# Patient Record
Sex: MALE | Race: WHITE | Employment: OTHER | URBAN - METROPOLITAN AREA
[De-identification: names, ages, dates, MRNs, and addresses within clinical notes are randomized per-mention and may not be internally consistent; named-entity substitution may affect disease eponyms.]

---

## 2017-08-14 ENCOUNTER — HOSPITAL ENCOUNTER (INPATIENT)
Facility: HOSPITAL | Age: 72
LOS: 7 days | Discharge: HOME | DRG: 247 | End: 2017-08-21
Attending: EMERGENCY MEDICINE | Admitting: INTERNAL MEDICINE
Payer: MEDICARE

## 2017-08-14 ENCOUNTER — APPOINTMENT (OUTPATIENT)
Dept: CV DIAGNOSTICS | Facility: HOSPITAL | Age: 72
DRG: 247 | End: 2017-08-14
Attending: INTERNAL MEDICINE
Payer: MEDICARE

## 2017-08-14 ENCOUNTER — APPOINTMENT (OUTPATIENT)
Dept: INTERVENTIONAL RADIOLOGY/VASCULAR | Facility: HOSPITAL | Age: 72
DRG: 247 | End: 2017-08-14
Attending: INTERNAL MEDICINE
Payer: MEDICARE

## 2017-08-14 ENCOUNTER — PRIOR ORIGINAL RECORDS (OUTPATIENT)
Dept: OTHER | Age: 72
End: 2017-08-14

## 2017-08-14 DIAGNOSIS — I21.02 ST ELEVATION MYOCARDIAL INFARCTION INVOLVING LEFT ANTERIOR DESCENDING (LAD) CORONARY ARTERY (HCC): ICD-10-CM

## 2017-08-14 DIAGNOSIS — I21.3 ST ELEVATION MYOCARDIAL INFARCTION (STEMI), UNSPECIFIED ARTERY (HCC): Primary | ICD-10-CM

## 2017-08-14 DIAGNOSIS — N28.9 RENAL INSUFFICIENCY: ICD-10-CM

## 2017-08-14 LAB
ANION GAP SERPL CALC-SCNC: 14 MMOL/L (ref 0–18)
APTT PPP: 25.1 SECONDS (ref 23.2–35.3)
APTT PPP: 48.4 SECONDS (ref 23.2–35.3)
BASOPHILS # BLD: 0.1 K/UL (ref 0–0.2)
BASOPHILS NFR BLD: 1 %
BUN SERPL-MCNC: 15 MG/DL (ref 8–20)
BUN/CREAT SERPL: 9.3 (ref 10–20)
CALCIUM SERPL-MCNC: 9.1 MG/DL (ref 8.5–10.5)
CHLORIDE SERPL-SCNC: 101 MMOL/L (ref 95–110)
CO2 SERPL-SCNC: 22 MMOL/L (ref 22–32)
CREAT SERPL-MCNC: 1.61 MG/DL (ref 0.5–1.5)
EOSINOPHIL # BLD: 0.2 K/UL (ref 0–0.7)
EOSINOPHIL NFR BLD: 2 %
ERYTHROCYTE [DISTWIDTH] IN BLOOD BY AUTOMATED COUNT: 12.7 % (ref 11–15)
GLUCOSE SERPL-MCNC: 158 MG/DL (ref 70–99)
HCT VFR BLD AUTO: 41.1 % (ref 41–52)
HGB BLD-MCNC: 14.2 G/DL (ref 13.5–17.5)
INR BLD: 0.9 (ref 0.9–1.2)
LYMPHOCYTES # BLD: 3.6 K/UL (ref 1–4)
LYMPHOCYTES NFR BLD: 33 %
MCH RBC QN AUTO: 32.7 PG (ref 27–32)
MCHC RBC AUTO-ENTMCNC: 34.6 G/DL (ref 32–37)
MCV RBC AUTO: 94.5 FL (ref 80–100)
MONOCYTES # BLD: 0.8 K/UL (ref 0–1)
MONOCYTES NFR BLD: 7 %
NEUTROPHILS # BLD AUTO: 6.2 K/UL (ref 1.8–7.7)
NEUTROPHILS NFR BLD: 57 %
OSMOLALITY UR CALC.SUM OF ELEC: 288 MOSM/KG (ref 275–295)
PLATELET # BLD AUTO: 423 K/UL (ref 140–400)
PMV BLD AUTO: 7.9 FL (ref 7.4–10.3)
POTASSIUM SERPL-SCNC: 4 MMOL/L (ref 3.3–5.1)
PROTHROMBIN TIME: 12 SECONDS (ref 11.8–14.5)
RBC # BLD AUTO: 4.35 M/UL (ref 4.5–5.9)
SODIUM SERPL-SCNC: 137 MMOL/L (ref 136–144)
TROPONIN I SERPL-MCNC: 0 NG/ML (ref ?–0.03)
WBC # BLD AUTO: 10.8 K/UL (ref 4–11)

## 2017-08-14 PROCEDURE — 80048 BASIC METABOLIC PNL TOTAL CA: CPT | Performed by: EMERGENCY MEDICINE

## 2017-08-14 PROCEDURE — 84484 ASSAY OF TROPONIN QUANT: CPT | Performed by: EMERGENCY MEDICINE

## 2017-08-14 PROCEDURE — 85025 COMPLETE CBC W/AUTO DIFF WBC: CPT | Performed by: EMERGENCY MEDICINE

## 2017-08-14 PROCEDURE — 80061 LIPID PANEL: CPT | Performed by: INTERNAL MEDICINE

## 2017-08-14 PROCEDURE — 027135Z DILATION OF CORONARY ARTERY, TWO ARTERIES WITH TWO DRUG-ELUTING INTRALUMINAL DEVICES, PERCUTANEOUS APPROACH: ICD-10-PCS | Performed by: INTERNAL MEDICINE

## 2017-08-14 PROCEDURE — 85730 THROMBOPLASTIN TIME PARTIAL: CPT | Performed by: INTERNAL MEDICINE

## 2017-08-14 PROCEDURE — 92928 PRQ TCAT PLMT NTRAC ST 1 LES: CPT

## 2017-08-14 PROCEDURE — 93010 ELECTROCARDIOGRAM REPORT: CPT | Performed by: INTERNAL MEDICINE

## 2017-08-14 PROCEDURE — 93306 TTE W/DOPPLER COMPLETE: CPT | Performed by: INTERNAL MEDICINE

## 2017-08-14 PROCEDURE — 93454 CORONARY ARTERY ANGIO S&I: CPT

## 2017-08-14 PROCEDURE — B2111ZZ FLUOROSCOPY OF MULTIPLE CORONARY ARTERIES USING LOW OSMOLAR CONTRAST: ICD-10-PCS | Performed by: INTERNAL MEDICINE

## 2017-08-14 PROCEDURE — 99285 EMERGENCY DEPT VISIT HI MDM: CPT

## 2017-08-14 PROCEDURE — 84484 ASSAY OF TROPONIN QUANT: CPT | Performed by: INTERNAL MEDICINE

## 2017-08-14 PROCEDURE — B240ZZ3 ULTRASONOGRAPHY OF SINGLE CORONARY ARTERY, INTRAVASCULAR: ICD-10-PCS | Performed by: INTERNAL MEDICINE

## 2017-08-14 PROCEDURE — 93005 ELECTROCARDIOGRAM TRACING: CPT

## 2017-08-14 PROCEDURE — 96374 THER/PROPH/DIAG INJ IV PUSH: CPT

## 2017-08-14 PROCEDURE — 99152 MOD SED SAME PHYS/QHP 5/>YRS: CPT

## 2017-08-14 PROCEDURE — 93010 ELECTROCARDIOGRAM REPORT: CPT | Performed by: EMERGENCY MEDICINE

## 2017-08-14 PROCEDURE — 99153 MOD SED SAME PHYS/QHP EA: CPT

## 2017-08-14 PROCEDURE — 4A023N7 MEASUREMENT OF CARDIAC SAMPLING AND PRESSURE, LEFT HEART, PERCUTANEOUS APPROACH: ICD-10-PCS | Performed by: INTERNAL MEDICINE

## 2017-08-14 PROCEDURE — 85730 THROMBOPLASTIN TIME PARTIAL: CPT | Performed by: EMERGENCY MEDICINE

## 2017-08-14 PROCEDURE — 92978 ENDOLUMINL IVUS OCT C 1ST: CPT

## 2017-08-14 PROCEDURE — 85610 PROTHROMBIN TIME: CPT | Performed by: EMERGENCY MEDICINE

## 2017-08-14 RX ORDER — 0.9 % SODIUM CHLORIDE 0.9 %
VIAL (ML) INJECTION
Status: DISPENSED
Start: 2017-08-14 | End: 2017-08-15

## 2017-08-14 RX ORDER — ACETAMINOPHEN 160 MG
2000 TABLET,DISINTEGRATING ORAL DAILY
COMMUNITY

## 2017-08-14 RX ORDER — ATORVASTATIN CALCIUM 40 MG/1
40 TABLET, FILM COATED ORAL NIGHTLY
Status: DISCONTINUED | OUTPATIENT
Start: 2017-08-14 | End: 2017-08-21

## 2017-08-14 RX ORDER — HEPARIN SODIUM 1000 [USP'U]/ML
60 INJECTION, SOLUTION INTRAVENOUS; SUBCUTANEOUS ONCE
Status: DISCONTINUED | OUTPATIENT
Start: 2017-08-14 | End: 2017-08-18 | Stop reason: ALTCHOICE

## 2017-08-14 RX ORDER — ASPIRIN 81 MG/1
81 TABLET, CHEWABLE ORAL DAILY
COMMUNITY

## 2017-08-14 RX ORDER — AZELASTINE 1 MG/ML
SPRAY, METERED NASAL 2 TIMES DAILY
COMMUNITY

## 2017-08-14 RX ORDER — MORPHINE SULFATE 4 MG/ML
4 INJECTION, SOLUTION INTRAMUSCULAR; INTRAVENOUS ONCE
Status: COMPLETED | OUTPATIENT
Start: 2017-08-14 | End: 2017-08-14

## 2017-08-14 RX ORDER — MORPHINE SULFATE 2 MG/ML
1-2 INJECTION, SOLUTION INTRAMUSCULAR; INTRAVENOUS ONCE
Status: COMPLETED | OUTPATIENT
Start: 2017-08-14 | End: 2017-08-14

## 2017-08-14 RX ORDER — SODIUM CHLORIDE 9 MG/ML
INJECTION, SOLUTION INTRAVENOUS
Status: COMPLETED
Start: 2017-08-14 | End: 2017-08-14

## 2017-08-14 RX ORDER — HEPARIN SODIUM AND DEXTROSE 10000; 5 [USP'U]/100ML; G/100ML
12 INJECTION INTRAVENOUS ONCE
Status: DISCONTINUED | OUTPATIENT
Start: 2017-08-14 | End: 2017-08-18 | Stop reason: ALTCHOICE

## 2017-08-14 RX ORDER — HEPARIN SODIUM 10000 [USP'U]/100ML
1000 INJECTION, SOLUTION INTRAVENOUS CONTINUOUS
Status: DISCONTINUED | OUTPATIENT
Start: 2017-08-14 | End: 2017-08-18 | Stop reason: ALTCHOICE

## 2017-08-14 RX ORDER — HYDROCHLOROTHIAZIDE 12.5 MG/1
12.5 CAPSULE, GELATIN COATED ORAL EVERY MORNING
Status: ON HOLD | COMMUNITY
End: 2017-08-21

## 2017-08-14 RX ORDER — SPIRONOLACTONE 25 MG/1
12.5 TABLET ORAL DAILY
Status: DISCONTINUED | OUTPATIENT
Start: 2017-08-14 | End: 2017-08-17

## 2017-08-14 RX ORDER — ATORVASTATIN CALCIUM 10 MG/1
10 TABLET, FILM COATED ORAL NIGHTLY
Status: ON HOLD | COMMUNITY
End: 2017-08-21

## 2017-08-14 RX ORDER — MIDAZOLAM HYDROCHLORIDE 1 MG/ML
INJECTION INTRAMUSCULAR; INTRAVENOUS
Status: COMPLETED
Start: 2017-08-14 | End: 2017-08-14

## 2017-08-14 RX ORDER — FEXOFENADINE HCL 180 MG/1
180 TABLET ORAL DAILY
COMMUNITY

## 2017-08-14 RX ORDER — HEPARIN SODIUM AND DEXTROSE 10000; 5 [USP'U]/100ML; G/100ML
INJECTION INTRAVENOUS CONTINUOUS
Status: DISCONTINUED | OUTPATIENT
Start: 2017-08-14 | End: 2017-08-21

## 2017-08-14 RX ORDER — NITROGLYCERIN 20 MG/100ML
5 INJECTION INTRAVENOUS CONTINUOUS
Status: DISCONTINUED | OUTPATIENT
Start: 2017-08-14 | End: 2017-08-15

## 2017-08-14 RX ORDER — NITROGLYCERIN 20 MG/100ML
INJECTION INTRAVENOUS
Status: DISPENSED
Start: 2017-08-14 | End: 2017-08-15

## 2017-08-14 RX ORDER — NITROGLYCERIN 0.4 MG/1
0.4 TABLET SUBLINGUAL EVERY 5 MIN PRN
Status: DISCONTINUED | OUTPATIENT
Start: 2017-08-14 | End: 2017-08-21

## 2017-08-14 RX ORDER — SODIUM CHLORIDE 9 MG/ML
INJECTION, SOLUTION INTRAVENOUS CONTINUOUS
Status: DISPENSED | OUTPATIENT
Start: 2017-08-14 | End: 2017-08-14

## 2017-08-14 RX ORDER — NITROGLYCERIN 20 MG/100ML
INJECTION INTRAVENOUS
Status: COMPLETED
Start: 2017-08-14 | End: 2017-08-14

## 2017-08-14 RX ORDER — MORPHINE SULFATE 4 MG/ML
INJECTION, SOLUTION INTRAMUSCULAR; INTRAVENOUS
Status: COMPLETED
Start: 2017-08-14 | End: 2017-08-14

## 2017-08-14 RX ORDER — ASPIRIN 81 MG/1
81 TABLET ORAL DAILY
Status: DISCONTINUED | OUTPATIENT
Start: 2017-08-14 | End: 2017-08-21

## 2017-08-14 RX ORDER — 0.9 % SODIUM CHLORIDE 0.9 %
VIAL (ML) INJECTION
Status: COMPLETED
Start: 2017-08-14 | End: 2017-08-14

## 2017-08-14 NOTE — PRE-SEDATION ASSESSMENT
Pre-Procedure Sedation Assessment    Chief Complaint/Indication for procedure: Acute MI    History of snoring or sleep or apnea?    Yes    History of previous problems with anesthesia or sedation  No    Physical Findings:  Neck: nl ROM  CV: N  PULM: normal

## 2017-08-14 NOTE — CONSULTS
[facility]    PATIENT'S NAME: Jihan Gonzalez   ATTENDING PHYSICIAN: Jessica Acevedo MD   CONSULTING PHYSICIAN: Kota Escobar.  Rakesh Charles MD   PATIENT ACCOUNT#:   [id number]    LOCATION:  [address]  MEDICAL RECORD #:   [id number]       DATE OF B present. No organomegaly, masses, bruits, or pulsations. EXTREMITIES:  Warm and dry. Pulses intact and equal.  No cyanosis, clubbing, or edema. No calf, thigh, or popliteal tenderness. NEUROLOGIC:  Normal.  SKIN:  Pale, mildly diaphoretic.   No lesions

## 2017-08-14 NOTE — PROGRESS NOTES
Patient found to have total occlusion of proximal LAD and 90% in large OM1. Both successfully opened with ADRIENNE. Echo: LV size normal. [place] hypokinetic. No gross thrombus. EKG: resolution of ST elevation. Loss of R waves in septal leads.     Having 2/10

## 2017-08-14 NOTE — PRE-SEDATION ASSESSMENT
Pre-Procedure Sedation Assessment    Chief Complaint/Indication for procedure: Acute anterior wall MI    History of snoring or sleep or apnea?    No    History of previous problems with anesthesia or sedation  No    Physical Findings:  Neck: nl ROM  CV: S1-

## 2017-08-14 NOTE — CONSULTS
Cardiology (consult dictated)    Assessment:  1. Acute anterior wall MI    Plan: Emergency cardiac cath possible PCI.  Risks benefits and alternatives explained to the patient who agrees to proceed

## 2017-08-14 NOTE — ED PROVIDER NOTES
Patient Seen in: [facility] AND [facility] Emergency Department    History   Patient presents with:  Chest Pain Angina (cardiovascular)    Stated Complaint:     HPI    67-year-old male with history of hypertension and hyperlipidemia here with complaints of sudde normal. Pupils are equal, round, and reactive to light. Neck: Normal range of motion. Cardiovascular: Normal rate and regular rhythm. No murmur heard.   2+ radial and DP pulses b/l, no leg swelling   Pulmonary/Chest: Effort normal and breath sounds n Monocyte Absolute 0.8 0.0 - 1.0 K/UL   Eosinophil Absolute 0.2 0.0 - 0.7 K/UL   Basophil Absolute 0.1 0.0 - 0.2 K/UL       Imaging:          EMERGENCY DEPARTMENT COURSE AND TREATMENT:  Patient's condition was fair during Emergency Department evaluation.

## 2017-08-14 NOTE — CODE DOCUMENTATION
Pt to ed34 via EMS for cardiac alert called in field. Per medics, 0.4 sublinqual nitro, 324mg chewable aspirin given. Pt arrives pale and diaphoretic. C/o cp w/ left arm numbness. Pt aox4. Wife at bedside.  No prior cardiac history aside from HTN and high c

## 2017-08-14 NOTE — PROGRESS NOTES
08/14/17 1521   Clinical Encounter Type   Visited With Patient and family together   Routine Visit Introduction   Continue Visiting Yes   Patient Spiritual Encounters   Spiritual Assessment Completed 1   Spiritual Needs Patient came into the ED with tang

## 2017-08-15 LAB
ANION GAP SERPL CALC-SCNC: 7 MMOL/L (ref 0–18)
APTT PPP: 60.9 SECONDS (ref 23.2–35.3)
BUN SERPL-MCNC: 14 MG/DL (ref 8–20)
BUN/CREAT SERPL: 10.1 (ref 10–20)
CALCIUM SERPL-MCNC: 8.6 MG/DL (ref 8.5–10.5)
CHLORIDE SERPL-SCNC: 105 MMOL/L (ref 95–110)
CHOLEST SERPL-MCNC: 165 MG/DL (ref 110–200)
CO2 SERPL-SCNC: 24 MMOL/L (ref 22–32)
CREAT SERPL-MCNC: 1.39 MG/DL (ref 0.5–1.5)
ERYTHROCYTE [DISTWIDTH] IN BLOOD BY AUTOMATED COUNT: 12.6 % (ref 11–15)
GLUCOSE SERPL-MCNC: 134 MG/DL (ref 70–99)
HCT VFR BLD AUTO: 36.3 % (ref 41–52)
HDLC SERPL-MCNC: 30 MG/DL
HGB BLD-MCNC: 12.6 G/DL (ref 13.5–17.5)
LDLC SERPL CALC-MCNC: 99 MG/DL (ref 0–99)
MCH RBC QN AUTO: 32.9 PG (ref 27–32)
MCHC RBC AUTO-ENTMCNC: 34.7 G/DL (ref 32–37)
MCV RBC AUTO: 94.7 FL (ref 80–100)
NONHDLC SERPL-MCNC: 135 MG/DL
OSMOLALITY UR CALC.SUM OF ELEC: 284 MOSM/KG (ref 275–295)
PLATELET # BLD AUTO: 325 K/UL (ref 140–400)
PMV BLD AUTO: 7.7 FL (ref 7.4–10.3)
POTASSIUM SERPL-SCNC: 4.4 MMOL/L (ref 3.3–5.1)
RBC # BLD AUTO: 3.84 M/UL (ref 4.5–5.9)
SODIUM SERPL-SCNC: 136 MMOL/L (ref 136–144)
TRIGL SERPL-MCNC: 178 MG/DL (ref 1–149)
TROPONIN I SERPL-MCNC: 33.17 NG/ML (ref ?–0.03)
WBC # BLD AUTO: 12.7 K/UL (ref 4–11)

## 2017-08-15 PROCEDURE — C9113 INJ PANTOPRAZOLE SODIUM, VIA: HCPCS | Performed by: INTERNAL MEDICINE

## 2017-08-15 PROCEDURE — 85730 THROMBOPLASTIN TIME PARTIAL: CPT | Performed by: INTERNAL MEDICINE

## 2017-08-15 PROCEDURE — 80048 BASIC METABOLIC PNL TOTAL CA: CPT | Performed by: INTERNAL MEDICINE

## 2017-08-15 PROCEDURE — 85027 COMPLETE CBC AUTOMATED: CPT | Performed by: INTERNAL MEDICINE

## 2017-08-15 RX ORDER — PANTOPRAZOLE SODIUM 40 MG/1
40 TABLET, DELAYED RELEASE ORAL
Status: DISCONTINUED | OUTPATIENT
Start: 2017-08-16 | End: 2017-08-21

## 2017-08-15 RX ORDER — ENALAPRIL MALEATE 2.5 MG/1
2.5 TABLET ORAL 2 TIMES DAILY
Status: DISCONTINUED | OUTPATIENT
Start: 2017-08-15 | End: 2017-08-17

## 2017-08-15 RX ORDER — MORPHINE SULFATE 2 MG/ML
1-2 INJECTION, SOLUTION INTRAMUSCULAR; INTRAVENOUS EVERY 4 HOURS PRN
Status: DISCONTINUED | OUTPATIENT
Start: 2017-08-15 | End: 2017-08-21

## 2017-08-15 RX ORDER — ACETAMINOPHEN 325 MG/1
650 TABLET ORAL EVERY 6 HOURS PRN
Status: DISCONTINUED | OUTPATIENT
Start: 2017-08-15 | End: 2017-08-21

## 2017-08-15 NOTE — DIETARY NOTE
NUTRITION:  Diet Education    Consult received for diet education per protocol. Education deferred until s/p intervention and when appropriate for teaching.     [address], [address] [facility]  Ext [phone]

## 2017-08-15 NOTE — PROGRESS NOTES
[facility] AND CLINICS  Progress Note    Benson Lan Patient Status:  Inpatient    1945 MRN [id number]   Location [facility] 2W/SW Attending Franci Amin MD   Hosp Day # 1 PCP No primary care provider on file. Assessment:    1.   Anterior wal (79.4 kg)      Physical Exam:    General: Alert and oriented x 3,  No apparent distress. HEENT: No focal deficits. Neck: No JVD, carotids 2+ no bruits.   Cardiac: Regular rate and rhythm, S1, S2 normal, no murmur  Lungs: Clear without wheezes, rales, rhon

## 2017-08-15 NOTE — PLAN OF CARE
Problem: CARDIOVASCULAR - ADULT  Goal: Maintains optimal cardiac output and hemodynamic stability  INTERVENTIONS:  - Monitor vital signs, rhythm, and trends  - Monitor for bleeding, hypotension and signs of decreased cardiac output  - Evaluate effectivenes and/or relaxation techniques  - Monitor for opioid side effects  - Notify MD/LIP if interventions unsuccessful or patient reports new pain  Outcome: Progressing

## 2017-08-16 LAB
ANION GAP SERPL CALC-SCNC: 8 MMOL/L (ref 0–18)
APTT PPP: 69.5 SECONDS (ref 23.2–35.3)
BUN SERPL-MCNC: 14 MG/DL (ref 8–20)
BUN/CREAT SERPL: 10.3 (ref 10–20)
CALCIUM SERPL-MCNC: 9 MG/DL (ref 8.5–10.5)
CHLORIDE SERPL-SCNC: 107 MMOL/L (ref 95–110)
CO2 SERPL-SCNC: 22 MMOL/L (ref 22–32)
CREAT SERPL-MCNC: 1.36 MG/DL (ref 0.5–1.5)
GLUCOSE SERPL-MCNC: 123 MG/DL (ref 70–99)
OSMOLALITY UR CALC.SUM OF ELEC: 286 MOSM/KG (ref 275–295)
POTASSIUM SERPL-SCNC: 3.5 MMOL/L (ref 3.3–5.1)
SODIUM SERPL-SCNC: 137 MMOL/L (ref 136–144)

## 2017-08-16 PROCEDURE — 97161 PT EVAL LOW COMPLEX 20 MIN: CPT

## 2017-08-16 PROCEDURE — 80048 BASIC METABOLIC PNL TOTAL CA: CPT | Performed by: INTERNAL MEDICINE

## 2017-08-16 PROCEDURE — 85730 THROMBOPLASTIN TIME PARTIAL: CPT | Performed by: INTERNAL MEDICINE

## 2017-08-16 RX ORDER — FLUTICASONE PROPIONATE 50 MCG
1 SPRAY, SUSPENSION (ML) NASAL DAILY
Status: DISCONTINUED | OUTPATIENT
Start: 2017-08-16 | End: 2017-08-21

## 2017-08-16 RX ORDER — METOPROLOL SUCCINATE 25 MG/1
25 TABLET, EXTENDED RELEASE ORAL
Status: DISCONTINUED | OUTPATIENT
Start: 2017-08-16 | End: 2017-08-17

## 2017-08-16 RX ORDER — METOPROLOL SUCCINATE 25 MG/1
25 TABLET, EXTENDED RELEASE ORAL
Status: DISCONTINUED | OUTPATIENT
Start: 2017-08-16 | End: 2017-08-16

## 2017-08-16 NOTE — PLAN OF CARE
CARDIOVASCULAR - ADULT    • Maintains optimal cardiac output and hemodynamic stability Progressing    • Absence of cardiac arrhythmias or at baseline Progressing      Patient continues with sinus rhythm.       PAIN - ADULT    • Verbalizes/displays adequate

## 2017-08-16 NOTE — PROGRESS NOTES
[facility] AND CLINICS  Progress Note    Lulú Miss Patient Status:  Inpatient    1945 MRN [id number]   Location [facility] 2W/SW Attending Ar Correa MD   [facility] Day # 2 PCP VISHAL LLOYD     Assessment:    1.   Anterior wall myocardial infarcti Results  Component Value Date    08/16/2017   K 3.5 08/16/2017    08/16/2017   CO2 22 08/16/2017   BUN 14 08/16/2017   CREATSERUM 1.36 08/16/2017    08/16/2017   CA 9.0 08/16/2017          Lab Results  Component Value Date   TROP 33.17 (

## 2017-08-16 NOTE — PAYOR COMM NOTE
Admit Orders     Start     Ordered    08/14/17 1352  Admit to inpatient Once  Once     Ordering Provider:  Abelardo Mccord MD   Question Answer Comment   Diagnosis ST elevation myocardial infarction (STEMI), unspecified artery ([facility] [address]    Level of Care Acute Smokeless tobacco: Never Used[NW.2]                          Review of Systems   Unable to perform ROS: Acuity of condition       Positive for stated complaint:   Other systems are as noted in HPI. Constitutional and vital signs reviewed.       All oth AVL, depression inferiorly    Cardiac Monitor:    Patient placed on the cardiac monitor and a rhythm strip obtained with the indication of chest pain.   Monitor shows regular rhythm at a rate of 71 bpm.     My interpretation is   normal for rate   normal rh specified. [NW.2]    Medications Prescribed:[NW.1]  There are no discharge medications for this patient. [NW.2]                Signed by Tiffanie Estrada MD on 8/14/2017 12:46 PM   Attribution Key     NW.1 - Tiffanie Estrada MD on 8/14/2017 12:33 PM   NW.2 - Gisella Mix ADMISSION DATE:       08/14/2017      CONSULT DATE:  08/14/2017     REPORT OF CONSULTATION     HISTORY OF PRESENT ILLNESS:  The patient is a 68-year-old man who developed severe anterior chest pain with mild shortness of breath and sweats this morning. rashes, or ulcers.     LABORATORY DATA:  CBC and differential were normal.  No other labs are back. EKG shows sinus rhythm with acute current of injury in the anterior leads and reciprocal changes in the inferior leads.     ASSESSMENT:    1. nitroglycerin drip.     2. The left ventricle on echo was normal sized. There is hypokinesis of the apex but I believe there is an excellent chance of recovery of this area. For now he is on a heparin drip to avoid apical thrombus.   Has been started on  08/15/2017   K 4.4 08/15/2017    08/15/2017   CO2 24 08/15/2017   BUN 14 08/15/2017   CREATSERUM 1.39 08/15/2017    08/15/2017   CA 8.6 08/15/2017         Lab Results  Component Value Date   LDL 99 08/14/2017   HDL 30 08/14/2017   TRI

## 2017-08-16 NOTE — CARDIAC REHAB
Cardiac Rehab Phase I    Activity:  Distance   Assistance needed   Patient tolerated activity . Education:  Handouts provided and reviewed: [address] Diet: Healthy Cardiac diet reviewed.     Disease Process: Disease process rev

## 2017-08-16 NOTE — PHYSICAL THERAPY NOTE
PHYSICAL THERAPY EVALUATION - INPATIENT     Room Number: 322/322-A  Evaluation Date: 8/16/2017  Type of Evaluation: Initial  Physician Order: PT Eval and Treat    Presenting Problem: ST elevated Myocardial infarction  Reason for Therapy: Mobility Dysfu elevation myocardial infarction (STEMI) [facility])      Past Medical History  Past Medical History:   Diagnosis Date   • Essential hypertension    • Hyperlipidemia        Past Surgical History  History reviewed. No pertinent surgical history.     HOME SITUATION back to sitting on the side of the bed?: None   How much help from another person does the patient currently need. ..   -   Moving to and from a bed to a chair (including a wheelchair)?: None   -   Need to walk in hospital room?: None   -   Climbing 3-5 charline

## 2017-08-17 ENCOUNTER — APPOINTMENT (OUTPATIENT)
Dept: CV DIAGNOSTICS | Facility: HOSPITAL | Age: 72
DRG: 247 | End: 2017-08-17
Attending: INTERNAL MEDICINE
Payer: MEDICARE

## 2017-08-17 LAB
ANION GAP SERPL CALC-SCNC: 6 MMOL/L (ref 0–18)
APTT PPP: 59.9 SECONDS (ref 23.2–35.3)
BUN SERPL-MCNC: 14 MG/DL (ref 8–20)
BUN/CREAT SERPL: 10.4 (ref 10–20)
CALCIUM SERPL-MCNC: 8.6 MG/DL (ref 8.5–10.5)
CHLORIDE SERPL-SCNC: 105 MMOL/L (ref 95–110)
CO2 SERPL-SCNC: 25 MMOL/L (ref 22–32)
CREAT SERPL-MCNC: 1.34 MG/DL (ref 0.5–1.5)
GLUCOSE SERPL-MCNC: 119 MG/DL (ref 70–99)
OSMOLALITY UR CALC.SUM OF ELEC: 284 MOSM/KG (ref 275–295)
POTASSIUM SERPL-SCNC: 3.4 MMOL/L (ref 3.3–5.1)
SODIUM SERPL-SCNC: 136 MMOL/L (ref 136–144)

## 2017-08-17 PROCEDURE — 97530 THERAPEUTIC ACTIVITIES: CPT

## 2017-08-17 PROCEDURE — 80048 BASIC METABOLIC PNL TOTAL CA: CPT | Performed by: INTERNAL MEDICINE

## 2017-08-17 PROCEDURE — 85730 THROMBOPLASTIN TIME PARTIAL: CPT | Performed by: INTERNAL MEDICINE

## 2017-08-17 PROCEDURE — 93307 TTE W/O DOPPLER COMPLETE: CPT | Performed by: INTERNAL MEDICINE

## 2017-08-17 PROCEDURE — 97116 GAIT TRAINING THERAPY: CPT

## 2017-08-17 RX ORDER — SPIRONOLACTONE 25 MG/1
25 TABLET ORAL DAILY
Status: DISCONTINUED | OUTPATIENT
Start: 2017-08-18 | End: 2017-08-21

## 2017-08-17 RX ORDER — METOPROLOL SUCCINATE 25 MG/1
25 TABLET, EXTENDED RELEASE ORAL ONCE
Status: COMPLETED | OUTPATIENT
Start: 2017-08-17 | End: 2017-08-17

## 2017-08-17 RX ORDER — METOPROLOL SUCCINATE 50 MG/1
50 TABLET, EXTENDED RELEASE ORAL
Status: DISCONTINUED | OUTPATIENT
Start: 2017-08-18 | End: 2017-08-21

## 2017-08-17 RX ORDER — ENALAPRIL MALEATE 5 MG/1
5 TABLET ORAL 2 TIMES DAILY
Status: DISCONTINUED | OUTPATIENT
Start: 2017-08-17 | End: 2017-08-19

## 2017-08-17 RX ORDER — WARFARIN SODIUM 7.5 MG/1
7.5 TABLET ORAL NIGHTLY
Status: DISCONTINUED | OUTPATIENT
Start: 2017-08-17 | End: 2017-08-21

## 2017-08-17 RX ORDER — METOPROLOL SUCCINATE 25 MG/1
25 TABLET, EXTENDED RELEASE ORAL NIGHTLY
Status: DISCONTINUED | OUTPATIENT
Start: 2017-08-18 | End: 2017-08-21

## 2017-08-17 NOTE — CARDIAC REHAB
Cardiac Rehab Phase I    Activity:  Distance Self  Assistance needed No  Patient tolerated activity Well. Education:  Handouts provided and reviewed: [address] Diet: Healthy Cardiac diet reviewed.     Disease Process: Disease p

## 2017-08-17 NOTE — PLAN OF CARE
Problem: Patient/Family Goals  Goal: Patient/Family Short Term Goal  Patient's Short Term Goal: be discharged with no complications    Interventions:   - Follow Acute mi protocol  - See additional Care Plan goals for specific interventions    Outcome: Prog

## 2017-08-17 NOTE — PROGRESS NOTES
[facility] AND CLINICS  Progress Note    Mariel Zuluaga Patient Status:  Inpatient    1945 MRN [id number]   Location [facility] 3W/SW Attending Srinivas Queen MD   Hosp Day # 3 PCP VISHAL LLOYD     Assessment:    1.  Anterior wall myocardial infarcti non-tender. Skin: Warm and dry.      Labs:       Lab Results  Component Value Date   INR 0.9 08/14/2017       Lab Results  Component Value Date    08/17/2017   K 3.4 08/17/2017    08/17/2017   CO2 25 08/17/2017   BUN 14 08/17/2017   CREATSERUM

## 2017-08-17 NOTE — PHYSICAL THERAPY NOTE
PHYSICAL THERAPY TREATMENT NOTE - INPATIENT    Room Number: 316/823-E       Presenting Problem: ST elevated Myocardial infarction    Problem List  Active Problems:    ST elevation myocardial infarction (STEMI) [facility])      ASSESSMENT   RN notified prior to and blankets)?: None   -   Sitting down on and standing up from a chair with arms (e.g., wheelchair, bedside commode, etc.): None   -   Moving from lying on back to sitting on the side of the bed?: None   How much help from another person does the patient

## 2017-08-18 LAB
ANION GAP SERPL CALC-SCNC: 6 MMOL/L (ref 0–18)
APTT PPP: 68.2 SECONDS (ref 23.2–35.3)
BUN SERPL-MCNC: 17 MG/DL (ref 8–20)
BUN/CREAT SERPL: 11.6 (ref 10–20)
CALCIUM SERPL-MCNC: 8.7 MG/DL (ref 8.5–10.5)
CHLORIDE SERPL-SCNC: 106 MMOL/L (ref 95–110)
CO2 SERPL-SCNC: 25 MMOL/L (ref 22–32)
CREAT SERPL-MCNC: 1.46 MG/DL (ref 0.5–1.5)
GLUCOSE SERPL-MCNC: 126 MG/DL (ref 70–99)
INR BLD: 1.1 (ref 0.9–1.2)
OSMOLALITY UR CALC.SUM OF ELEC: 287 MOSM/KG (ref 275–295)
POTASSIUM SERPL-SCNC: 3.8 MMOL/L (ref 3.3–5.1)
PROTHROMBIN TIME: 13.4 SECONDS (ref 11.8–14.5)
SODIUM SERPL-SCNC: 137 MMOL/L (ref 136–144)

## 2017-08-18 PROCEDURE — 80048 BASIC METABOLIC PNL TOTAL CA: CPT | Performed by: INTERNAL MEDICINE

## 2017-08-18 PROCEDURE — 85730 THROMBOPLASTIN TIME PARTIAL: CPT | Performed by: INTERNAL MEDICINE

## 2017-08-18 PROCEDURE — 85610 PROTHROMBIN TIME: CPT | Performed by: INTERNAL MEDICINE

## 2017-08-18 NOTE — RESTORATIVE THERAPY
RESTORATIVE CARE TREATMENT NOTE    Presenting Problem  Presenting Problem: ST elevated Myocardial infarction          Precautions  Precautions: Cardiac       Weight Bearing Restriction  Weight Bearing Restriction: None                   SUNDAY MONDAY TUESD

## 2017-08-18 NOTE — PROGRESS NOTES
[facility] AND CLINICS  Progress Note    Jarrod Chacon Patient Status:  Inpatient    1945 MRN [id number]   Location [facility] 3W/SW Attending Richard Cheng MD   Hosp Day # 4 PCP VISHAL LLOYD     Assessment:    1.  Anterior wall myocardial infarcti non-tender. Skin: Warm and dry.      Labs:       Lab Results  Component Value Date   INR 1.1 08/18/2017       Lab Results  Component Value Date    08/18/2017   K 3.8 08/18/2017    08/18/2017   CO2 25 08/18/2017   BUN 17 08/18/2017   CREATSERUM

## 2017-08-18 NOTE — PLAN OF CARE
Problem: Patient/Family Goals  Goal: Patient/Family Long Term Goal  Patient's Long Term Goal: Attend Daughters Wedding in October  Interventions:  -   - See additional Care Plan goals for specific interventions    Outcome: Progressing    Goal: Patient/Fami Manage/alleviate anxiety  - Utilize distraction and/or relaxation techniques  - Monitor for opioid side effects  - Notify MD/LIP if interventions unsuccessful or patient reports new pain   Outcome: Progressing      Problem: Patient Centered Care  Goal: Madeline Gandara

## 2017-08-19 LAB
APTT PPP: 72.3 SECONDS (ref 23.2–35.3)
INR BLD: 1.3 (ref 0.9–1.2)
PROTHROMBIN TIME: 15.9 SECONDS (ref 11.8–14.5)

## 2017-08-19 PROCEDURE — 85610 PROTHROMBIN TIME: CPT | Performed by: INTERNAL MEDICINE

## 2017-08-19 PROCEDURE — 85730 THROMBOPLASTIN TIME PARTIAL: CPT | Performed by: INTERNAL MEDICINE

## 2017-08-19 RX ORDER — LISINOPRIL 5 MG/1
5 TABLET ORAL DAILY
Status: DISCONTINUED | OUTPATIENT
Start: 2017-08-20 | End: 2017-08-21

## 2017-08-19 NOTE — PROGRESS NOTES
[facility] AND CLINICS  Progress Note    Trish Jon Patient Status:  Inpatient    1945 MRN [id number]   Location [facility] 3W/SW Attending Nixon Marin MD   Hosp Day # 5 PCP VISHAL LLOYD     Assessment:    1.  Anterior wall myocardial infarcti Labs:       Lab Results  Component Value Date   INR 1.3 (H) 08/19/2017             Lab Results  Component Value Date   TROP 33.17 (HH) 08/14/2017   TROP 0.00 08/14/2017        Medications:    • lisinopril  5 mg Oral Daily   • ticagrelor  90 mg Oral Q12

## 2017-08-20 LAB
ANION GAP SERPL CALC-SCNC: 10 MMOL/L (ref 0–18)
APTT PPP: 71.1 SECONDS (ref 23.2–35.3)
APTT PPP: 91 SECONDS (ref 23.2–35.3)
BUN SERPL-MCNC: 22 MG/DL (ref 8–20)
BUN/CREAT SERPL: 13.6 (ref 10–20)
CALCIUM SERPL-MCNC: 8.8 MG/DL (ref 8.5–10.5)
CHLORIDE SERPL-SCNC: 105 MMOL/L (ref 95–110)
CO2 SERPL-SCNC: 23 MMOL/L (ref 22–32)
CREAT SERPL-MCNC: 1.62 MG/DL (ref 0.5–1.5)
GLUCOSE SERPL-MCNC: 109 MG/DL (ref 70–99)
INR BLD: 1.7 (ref 0.9–1.2)
OSMOLALITY UR CALC.SUM OF ELEC: 290 MOSM/KG (ref 275–295)
POTASSIUM SERPL-SCNC: 4.2 MMOL/L (ref 3.3–5.1)
PROTHROMBIN TIME: 19.2 SECONDS (ref 11.8–14.5)
SODIUM SERPL-SCNC: 138 MMOL/L (ref 136–144)

## 2017-08-20 PROCEDURE — 85730 THROMBOPLASTIN TIME PARTIAL: CPT | Performed by: INTERNAL MEDICINE

## 2017-08-20 PROCEDURE — 85610 PROTHROMBIN TIME: CPT | Performed by: INTERNAL MEDICINE

## 2017-08-20 PROCEDURE — 80048 BASIC METABOLIC PNL TOTAL CA: CPT | Performed by: INTERNAL MEDICINE

## 2017-08-20 NOTE — PROGRESS NOTES
[facility]    Progress Note    Ramon Doe Patient Status:  Inpatient    1945 MRN [id number]   Location [facility] 3W/SW Attending Semaj Reddy, [address] [facility] Se Day # 6 PCP VISHAL LLOYD       Subjective:   Ramon Doe is a(n) 74 yea Normal excursions and effort. Abdomen: Soft, non-tender. No mass or rebound, BS-present x 4. Extremities: Without cyanosis or edema.   Peripheral pulses are 2+, equal and bilateral.  Cath site - C/D/I      Assessment and Plan:       1.  Anterior wall myoc

## 2017-08-21 VITALS
SYSTOLIC BLOOD PRESSURE: 109 MMHG | OXYGEN SATURATION: 98 % | HEART RATE: 68 BPM | WEIGHT: 168 LBS | TEMPERATURE: 98 F | HEIGHT: 68 IN | RESPIRATION RATE: 18 BRPM | DIASTOLIC BLOOD PRESSURE: 69 MMHG | BODY MASS INDEX: 25.46 KG/M2

## 2017-08-21 LAB
ANION GAP SERPL CALC-SCNC: 7 MMOL/L (ref 0–18)
APTT PPP: 80.2 SECONDS (ref 23.2–35.3)
BUN SERPL-MCNC: 20 MG/DL (ref 8–20)
BUN/CREAT SERPL: 12.4 (ref 10–20)
CALCIUM SERPL-MCNC: 8.7 MG/DL (ref 8.5–10.5)
CHLORIDE SERPL-SCNC: 106 MMOL/L (ref 95–110)
CO2 SERPL-SCNC: 25 MMOL/L (ref 22–32)
CREAT SERPL-MCNC: 1.61 MG/DL (ref 0.5–1.5)
GLUCOSE SERPL-MCNC: 104 MG/DL (ref 70–99)
INR BLD: 2.1 (ref 0.9–1.2)
OSMOLALITY UR CALC.SUM OF ELEC: 289 MOSM/KG (ref 275–295)
POTASSIUM SERPL-SCNC: 4.3 MMOL/L (ref 3.3–5.1)
PROTHROMBIN TIME: 23.1 SECONDS (ref 11.8–14.5)
SODIUM SERPL-SCNC: 138 MMOL/L (ref 136–144)

## 2017-08-21 PROCEDURE — 85730 THROMBOPLASTIN TIME PARTIAL: CPT | Performed by: INTERNAL MEDICINE

## 2017-08-21 PROCEDURE — 80048 BASIC METABOLIC PNL TOTAL CA: CPT | Performed by: NURSE PRACTITIONER

## 2017-08-21 PROCEDURE — 85610 PROTHROMBIN TIME: CPT | Performed by: INTERNAL MEDICINE

## 2017-08-21 RX ORDER — LOSARTAN POTASSIUM 25 MG/1
25 TABLET ORAL DAILY
Status: DISCONTINUED | OUTPATIENT
Start: 2017-08-21 | End: 2017-08-21

## 2017-08-21 RX ORDER — LOSARTAN POTASSIUM 25 MG/1
25 TABLET ORAL DAILY
Qty: 30 TABLET | Refills: 5 | Status: SHIPPED | OUTPATIENT
Start: 2017-08-21

## 2017-08-21 RX ORDER — METOPROLOL SUCCINATE 25 MG/1
25 TABLET, EXTENDED RELEASE ORAL NIGHTLY
Qty: 30 TABLET | Refills: 5 | Status: SHIPPED | OUTPATIENT
Start: 2017-08-21

## 2017-08-21 RX ORDER — PANTOPRAZOLE SODIUM 40 MG/1
40 TABLET, DELAYED RELEASE ORAL
Qty: 30 TABLET | Refills: 5 | Status: SHIPPED | OUTPATIENT
Start: 2017-08-21

## 2017-08-21 RX ORDER — WARFARIN SODIUM 7.5 MG/1
7.5 TABLET ORAL NIGHTLY
Qty: 30 TABLET | Refills: 11 | Status: SHIPPED | OUTPATIENT
Start: 2017-08-21

## 2017-08-21 RX ORDER — METOPROLOL SUCCINATE 50 MG/1
50 TABLET, EXTENDED RELEASE ORAL
Qty: 30 TABLET | Refills: 5 | Status: SHIPPED | OUTPATIENT
Start: 2017-08-21

## 2017-08-21 RX ORDER — ATORVASTATIN CALCIUM 40 MG/1
40 TABLET, FILM COATED ORAL NIGHTLY
Qty: 30 TABLET | Refills: 5 | Status: SHIPPED | OUTPATIENT
Start: 2017-08-21

## 2017-08-21 RX ORDER — WARFARIN SODIUM 5 MG/1
7.5 TABLET ORAL NIGHTLY
Qty: 45 TABLET | Refills: 5 | Status: SHIPPED | OUTPATIENT
Start: 2017-08-21 | End: 2017-08-21

## 2017-08-21 RX ORDER — SPIRONOLACTONE 25 MG/1
25 TABLET ORAL DAILY
Qty: 30 TABLET | Refills: 5 | Status: SHIPPED | OUTPATIENT
Start: 2017-08-21

## 2017-08-21 NOTE — PROGRESS NOTES
[facility] Heart Cardiology Progress Note      Emily Reinosomond Patient Status:  Inpatient    1945 MRN [id number]   Location [facility] 3W/SW Attending Anders Pike MD   [address] Day # 7 PCP VISHAL Agosto deficits  Skin: no rashes or lesions, right groin clean dry intact, no hematoma.      Scheduled Meds:   • lisinopril  5 mg Oral Daily   • ticagrelor  90 mg Oral Q12H   • spironolactone  25 mg Oral Daily   • Metoprolol Succinate ER  50 mg Oral Daily Beta Blo

## 2017-08-21 NOTE — DISCHARGE SUMMARY
[facility]    PATIENT'S NAME: Alline Dress   ATTENDING PHYSICIAN: Madison Shi MD   PATIENT ACCOUNT#:   [id number]    LOCATION:  [address] [place] [address] #:   [id number]       YOB: 1945  ADMISSION DATE:       08/14/2017 spironolactone 25 q.a.m., pantoprazole 40 q.a.m., and atorvastatin 40 at bedtime. DISCHARGE INSTRUCTIONS:  He will follow up with me in the office in about a week. He will be enrolled in the [employer].   He will get a basic metabol

## 2017-08-21 NOTE — PROGRESS NOTES
[facility] Heart Cardiology Progress Note      Linden Harmon Patient Status:  Inpatient    1945 MRN [id number]   Location [facility] 3W/SW Attending Jean Jeff, [address] [facility] Se Day # 7 PCP Ling Cotto S1,S2 ,no S3 or murmur  Abd: Abdomen soft, nontender, nondistended, no organomegaly, bowel sounds present  Ext:  no clubbing, no cyanosis,no edema  Neuro: no focal deficits  Skin: no rashes or lesions, right groin clean dry intact, no hematoma.      Schedul

## 2017-08-22 ENCOUNTER — PRIOR ORIGINAL RECORDS (OUTPATIENT)
Dept: OTHER | Age: 72
End: 2017-08-22

## 2017-08-22 LAB
BUN: 20 MG/DL
CALCIUM: 8.7 MG/DL
CHLORIDE: 106 MEQ/L
CHOLESTEROL, TOTAL: 165 MG/DL
CREATININE, SERUM: 1.61 MG/DL
GLUCOSE: 104 MG/DL
HDL CHOLESTEROL: 30 MG/DL
LDL CHOLESTEROL: 99 MG/DL
NON-HDL CHOLESTEROL: 135 MG/DL
POTASSIUM, SERUM: 4.3 MEQ/L
SODIUM: 138 MEQ/L
TRIGLYCERIDES: 178 MG/DL

## 2017-08-25 ENCOUNTER — MYAURORA ACCOUNT LINK (OUTPATIENT)
Dept: OTHER | Age: 72
End: 2017-08-25

## 2017-08-25 ENCOUNTER — PRIOR ORIGINAL RECORDS (OUTPATIENT)
Dept: OTHER | Age: 72
End: 2017-08-25

## 2017-08-25 LAB
HEMATOCRIT: 39.1 %
HEMOGLOBIN: 13.2 G/DL
MCV: 99.2 FL
RED BLOOD COUNT: 3.94 X 10-6/U
WHITE BLOOD COUNT: 8.8 X 10-3/U

## 2017-08-28 NOTE — PAYOR COMM NOTE
REF# [id number]    DISCHARGED 8/21    [facility] #[id number]  (68 year old M)     [facility] [id number]    Draft: Not Electronically Signed.    Dmitriy Biswas MD Physician Unsigned Transcription Cardiology  Discharge Summaries Date of Service: 8/21/2017 He also presented with renal insufficiency. His initial creatinine was 1.61. It improved into the 1.3 range and then went back to 1.6 at discharge.   His baseline renal function is not known.     DISCHARGE MEDICATIONS:  Aspirin 81 q.a.m., ticagrelor 90 mg

## 2017-08-30 ENCOUNTER — PRIOR ORIGINAL RECORDS (OUTPATIENT)
Dept: OTHER | Age: 72
End: 2017-08-30

## 2019-02-28 VITALS
OXYGEN SATURATION: 98 % | SYSTOLIC BLOOD PRESSURE: 94 MMHG | WEIGHT: 166 LBS | DIASTOLIC BLOOD PRESSURE: 56 MMHG | HEART RATE: 64 BPM | BODY MASS INDEX: 25.16 KG/M2 | HEIGHT: 68 IN